# Patient Record
Sex: MALE | Race: WHITE
[De-identification: names, ages, dates, MRNs, and addresses within clinical notes are randomized per-mention and may not be internally consistent; named-entity substitution may affect disease eponyms.]

---

## 2020-11-20 ENCOUNTER — HOSPITAL ENCOUNTER (EMERGENCY)
Dept: HOSPITAL 1 - ED | Age: 40
Discharge: HOME | End: 2020-11-20
Payer: COMMERCIAL

## 2020-11-20 VITALS
WEIGHT: 180 LBS | BODY MASS INDEX: 27.28 KG/M2 | HEIGHT: 68 IN | WEIGHT: 180 LBS | BODY MASS INDEX: 27.28 KG/M2 | HEIGHT: 68 IN

## 2020-11-20 VITALS — DIASTOLIC BLOOD PRESSURE: 76 MMHG | SYSTOLIC BLOOD PRESSURE: 126 MMHG

## 2020-11-20 DIAGNOSIS — R63.0: ICD-10-CM

## 2020-11-20 DIAGNOSIS — R53.83: ICD-10-CM

## 2020-11-20 DIAGNOSIS — M79.10: Primary | ICD-10-CM

## 2020-11-20 DIAGNOSIS — Z20.828: ICD-10-CM

## 2020-11-20 LAB
ALBUMIN SERPL-MCNC: 4 G/DL (ref 3.4–5)
ALP SERPL-CCNC: 75 U/L (ref 46–116)
ALT SERPL-CCNC: 22 U/L (ref 16–63)
AMPHETAMINES UR QL SCN: (no result)
AST SERPL-CCNC: 16 U/L (ref 15–37)
BASOPHILS NFR BLD: 0.2 % (ref 0–2)
BILIRUB SERPL-MCNC: 0.5 MG/DL (ref 0.2–1)
BUN SERPL-MCNC: 6 MG/DL (ref 7–18)
CALCIUM SERPL-MCNC: 9.3 MG/DL (ref 8.5–10.1)
CHLORIDE SERPL-SCNC: 106 MMOL/L (ref 98–107)
CO2 SERPL-SCNC: 24.5 MMOL/L (ref 21–32)
CREAT SERPL-MCNC: 1.2 MG/DL (ref 0.7–1.3)
ERYTHROCYTE [DISTWIDTH] IN BLOOD BY AUTOMATED COUNT: 13.8 % (ref 11.5–14.5)
GFR SERPLBLD BASED ON 1.73 SQ M-ARVRAT: > 60 ML/MIN
GLUCOSE SERPL-MCNC: 113 MG/DL (ref 74–106)
PLATELET # BLD: 350 X10^3MCL (ref 130–400)
POTASSIUM SERPL-SCNC: 3.6 MMOL/L (ref 3.5–5.1)
PROT SERPL-MCNC: 7 G/DL (ref 6.4–8.2)
SODIUM SERPL-SCNC: 141 MMOL/L (ref 136–145)
T3 SERPL-MCNC: 1.47 NG/ML
T3RU NFR SERPL: 38 % UPTAKE (ref 33–39)
T4 FREE SERPL-MCNC: 1.29 NG/DL (ref 0.76–1.46)
T4 SERPL-MCNC: 10.5 UG/DL (ref 4.7–13.3)
T4/T3 UPTAKE INDEX SERPL: 4 UG/DL (ref 1.4–4.5)

## 2020-11-20 PROCEDURE — U0003 INFECTIOUS AGENT DETECTION BY NUCLEIC ACID (DNA OR RNA); SEVERE ACUTE RESPIRATORY SYNDROME CORONAVIRUS 2 (SARS-COV-2) (CORONAVIRUS DISEASE [COVID-19]), AMPLIFIED PROBE TECHNIQUE, MAKING USE OF HIGH THROUGHPUT TECHNOLOGIES AS DESCRIBED BY CMS-2020-01-R: HCPCS

## 2023-08-22 ENCOUNTER — HOSPITAL ENCOUNTER (EMERGENCY)
Facility: HOSPITAL | Age: 43
Discharge: HOME | End: 2023-08-22
Attending: EMERGENCY MEDICINE

## 2023-08-22 VITALS
TEMPERATURE: 98.2 F | OXYGEN SATURATION: 99 % | HEIGHT: 68 IN | BODY MASS INDEX: 27.28 KG/M2 | RESPIRATION RATE: 18 BRPM | DIASTOLIC BLOOD PRESSURE: 70 MMHG | WEIGHT: 180 LBS | SYSTOLIC BLOOD PRESSURE: 122 MMHG | HEART RATE: 70 BPM

## 2023-08-22 DIAGNOSIS — K04.7 DENTAL INFECTION: ICD-10-CM

## 2023-08-22 DIAGNOSIS — K08.89 PAIN, DENTAL: ICD-10-CM

## 2023-08-22 DIAGNOSIS — K03.81 CRACKED TOOTH: Primary | ICD-10-CM

## 2023-08-22 DIAGNOSIS — L28.2 PRURITIC RASH: ICD-10-CM

## 2023-08-22 PROCEDURE — 99283 EMERGENCY DEPT VISIT LOW MDM: CPT

## 2023-08-22 RX ORDER — PREDNISONE 20 MG/1
20 TABLET ORAL 2 TIMES DAILY
Qty: 10 TABLET | Refills: 0 | Status: SHIPPED | OUTPATIENT
Start: 2023-08-22 | End: 2023-08-27

## 2023-08-22 RX ORDER — HYDROXYZINE HYDROCHLORIDE 25 MG/1
25 TABLET, FILM COATED ORAL EVERY 8 HOURS PRN
Qty: 30 TABLET | Refills: 0 | Status: SHIPPED | OUTPATIENT
Start: 2023-08-22 | End: 2023-09-01

## 2023-08-22 RX ORDER — NAPROXEN 500 MG/1
500 TABLET ORAL 2 TIMES DAILY PRN
Qty: 30 TABLET | Refills: 0 | Status: SHIPPED | OUTPATIENT
Start: 2023-08-22

## 2023-08-22 RX ORDER — AMOXICILLIN 500 MG/1
500 CAPSULE ORAL 3 TIMES DAILY
Qty: 21 CAPSULE | Refills: 0 | Status: SHIPPED | OUTPATIENT
Start: 2023-08-22 | End: 2023-08-29

## 2023-08-22 RX ORDER — LIDOCAINE HYDROCHLORIDE 20 MG/ML
10 SOLUTION OROPHARYNGEAL EVERY 4 HOURS PRN
Qty: 110 ML | Refills: 0 | Status: SHIPPED | OUTPATIENT
Start: 2023-08-22 | End: 2023-08-27

## 2023-08-22 ASSESSMENT — PAIN SCALES - GENERAL
PAINLEVEL_OUTOF10: 10
PAINLEVEL_OUTOF10: 6

## 2023-08-22 ASSESSMENT — PAIN - FUNCTIONAL ASSESSMENT
PAIN_FUNCTIONAL_ASSESSMENT: 0-10
PAIN_FUNCTIONAL_ASSESSMENT: 0-10

## 2023-08-22 ASSESSMENT — ENCOUNTER SYMPTOMS: RESPIRATORY NEGATIVE: 1

## 2023-08-22 NOTE — ED TRIAGE NOTES
Pt came from triage , pt complains of on and off dental pain for few days, he said he has broken tooth , denies fever/chills

## 2023-08-22 NOTE — DISCHARGE INSTRUCTIONS
Take medication as prescribed. Follow-up with your primary care physician within 2 days for reassessment. Bring the results from this visit with you for their review. Return to the ED immediately for any new, worsening, or persistent symptoms, including fever, vomiting, or any other medical concerns. Follow-up with one of the provided dental clinics below:    [address] [facility] -Extraction [phone] [facility] [phone], [phone]  [address] [place] [address] [phone]  [facility] [phone], [phone]      Call to schedule an appointment.

## 2023-08-22 NOTE — ED PROVIDER NOTES
EMERGENCY DEPARTMENT HISTORY AND PHYSICAL EXAM    3:14 PM      Date: 8/22/2023  Patient Name: Franchesca Bassett    History of Presenting Illness     Chief Complaint   Patient presents with    Dental Pain         History Provided By: Patient and medical chart review. Additional History (Context): Franchesca Bassett is a 44 y.o. male with No past medical history on file. who presents with complaints of dental pain for the past 2 days. States he has a cracked tooth that he has had for several years and has never really given him any issues. States he started with some cold sensitivity about 2 months ago and now he has been getting pain on and off. States dental pain started about 2 days ago. States he tried Motrin, Tylenol and Orajel no relief in any symptoms. States last night he could not sleep due to the pain. Patient denies any fevers. States occasionally he feels like he can taste some blood. Patient denies any chest pain or shortness of breath. Patient also reports he has had a rash. States the rash has been going on for the past 2 to 3 weeks started to his upper to his lower forearms. Now it is to his upper arms underarms and back. States is an itchy rash. Denies any changes in soaps or body wash. Has not tried any medications to relieve his rash. PCP: No primary care provider on file. No current facility-administered medications for this encounter. Current Outpatient Medications   Medication Sig Dispense Refill    naproxen (NAPROSYN) 500 MG tablet Take 1 tablet by mouth 2 times daily as needed for Pain 30 tablet 0    lidocaine viscous hcl (XYLOCAINE) 2 % SOLN solution Take 10 mLs by mouth every 4 hours as needed for Dental Pain or Pain Take 15 mLs by mouth every 4 hours as needed for Irritation or Pain.   You can swish and swallow or gargle 110 mL 0    amoxicillin (AMOXIL) 500 MG capsule Take 1 capsule by mouth 3 times daily for 7 days 21 capsule 0    predniSONE (DELTASONE) 20

## 2024-02-06 ENCOUNTER — HOSPITAL ENCOUNTER (EMERGENCY)
Facility: HOSPITAL | Age: 44
Discharge: HOME | End: 2024-02-06
Payer: COMMERCIAL

## 2024-02-06 VITALS
OXYGEN SATURATION: 97 % | HEART RATE: 83 BPM | DIASTOLIC BLOOD PRESSURE: 91 MMHG | SYSTOLIC BLOOD PRESSURE: 112 MMHG | RESPIRATION RATE: 18 BRPM | TEMPERATURE: 98.2 F

## 2024-02-06 DIAGNOSIS — U07.1 COVID: Primary | ICD-10-CM

## 2024-02-06 LAB
FLUAV RNA SPEC QL NAA+PROBE: NOT DETECTED
FLUBV RNA SPEC QL NAA+PROBE: NOT DETECTED
SARS-COV-2 RNA RESP QL NAA+PROBE: DETECTED

## 2024-02-06 PROCEDURE — 87636 SARSCOV2 & INF A&B AMP PRB: CPT

## 2024-02-06 PROCEDURE — 99283 EMERGENCY DEPT VISIT LOW MDM: CPT

## 2024-02-06 RX ORDER — PSEUDOEPHEDRINE HCL 120 MG/1
120 TABLET, FILM COATED, EXTENDED RELEASE ORAL EVERY 12 HOURS
Qty: 20 TABLET | Refills: 0 | Status: SHIPPED | OUTPATIENT
Start: 2024-02-06 | End: 2024-02-16

## 2024-02-06 RX ORDER — FLUTICASONE PROPIONATE 50 MCG
1 SPRAY, SUSPENSION (ML) NASAL DAILY
Qty: 32 G | Refills: 1 | Status: SHIPPED | OUTPATIENT
Start: 2024-02-06

## 2024-02-06 ASSESSMENT — ENCOUNTER SYMPTOMS
NAUSEA: 0
SINUS PRESSURE: 1
VOMITING: 0

## 2024-02-06 NOTE — ED TRIAGE NOTES
Pt arrived in ER complaining of sinus headache, fatigue, chills, nausea, loss of appetite, and ear pain. Pt states he feels like his balance is off from the pressure.

## 2024-02-06 NOTE — ED PROVIDER NOTES
components:       Result Value    SARS-CoV-2, PCR Detected (*)     All other components within normal limits       All other labs were within normal range or not returned as of this dictation.    EMERGENCY DEPARTMENT COURSE and DIFFERENTIAL DIAGNOSIS/MDM:   Vitals:    Vitals:    02/06/24 0759   BP: (!) 112/91   Pulse: 83   Resp: 18   Temp: 98.2 °F (36.8 °C)   TempSrc: Oral   SpO2: 97%       Discussion with patient regarding positive COVID test results.  Will forego any kind of advanced imaging to evaluate for any kind of tumor process and encouraged him PCP follow-up and could if especially if nonresolution of his symptoms CT scan performed them but has reasonable explanation for his symptoms today.    Medical Decision Making  Risk  OTC drugs.            REASSESSMENT        FINAL IMPRESSION      1. COVID          DISPOSITION/PLAN   DISPOSITION Decision To Discharge 02/06/2024 09:48:01 AM      PATIENT REFERRED TO:  [facility]  [address] [address]  [phone]        [facility] EMERGENCY DEPT  [address] [address]  [phone]    If symptoms worsen or unable to obtain follow up, return immediately      DISCHARGE MEDICATIONS:  New Prescriptions    FLUTICASONE (FLONASE) 50 MCG/ACT NASAL SPRAY    1 spray by Each Nostril route daily    PSEUDOEPHEDRINE (SUDAFED 12 HOUR) 120 MG EXTENDED RELEASE TABLET    Take 1 tablet by mouth in the morning and 1 tablet in the evening. Do all this for 10 days.     Controlled Substances Monitoring:          No data to display                (Please note that portions of this note were completed with a voice recognition program.  Efforts were made to edit the dictations but occasionally words are mis-transcribed.)    ADELA Bernal (electronically signed)  Attending Emergency Physician           Hortensia Petersen PA  02/06/24 2409

## 2024-10-12 ENCOUNTER — HOSPITAL ENCOUNTER (EMERGENCY)
Facility: HOSPITAL | Age: 44
Discharge: HOME | End: 2024-10-12

## 2024-10-12 VITALS
RESPIRATION RATE: 20 BRPM | HEIGHT: 69 IN | DIASTOLIC BLOOD PRESSURE: 98 MMHG | TEMPERATURE: 97.9 F | BODY MASS INDEX: 27.4 KG/M2 | HEART RATE: 85 BPM | SYSTOLIC BLOOD PRESSURE: 118 MMHG | WEIGHT: 185 LBS | OXYGEN SATURATION: 96 %

## 2024-10-12 DIAGNOSIS — S05.01XA ABRASION OF RIGHT CORNEA, INITIAL ENCOUNTER: Primary | ICD-10-CM

## 2024-10-12 DIAGNOSIS — K08.89 PAIN, DENTAL: ICD-10-CM

## 2024-10-12 PROCEDURE — 2500000003 HC RX 250 WO HCPCS

## 2024-10-12 PROCEDURE — 6370000000 HC RX 637 (ALT 250 FOR IP)

## 2024-10-12 PROCEDURE — 99283 EMERGENCY DEPT VISIT LOW MDM: CPT

## 2024-10-12 RX ORDER — ERYTHROMYCIN 5 MG/G
OINTMENT OPHTHALMIC
Qty: 1 EACH | Refills: 0 | Status: SHIPPED | OUTPATIENT
Start: 2024-10-12

## 2024-10-12 RX ORDER — LIDOCAINE HYDROCHLORIDE 20 MG/ML
15 SOLUTION OROPHARYNGEAL
Qty: 100 ML | Refills: 0 | Status: SHIPPED | OUTPATIENT
Start: 2024-10-12

## 2024-10-12 RX ORDER — PROPARACAINE HYDROCHLORIDE 5 MG/ML
1 SOLUTION/ DROPS OPHTHALMIC
Status: COMPLETED | OUTPATIENT
Start: 2024-10-12 | End: 2024-10-12

## 2024-10-12 RX ADMIN — FLUORESCEIN SODIUM 2 MG: 1 STRIP OPHTHALMIC at 15:00

## 2024-10-12 RX ADMIN — PROPARACAINE HYDROCHLORIDE 1 DROP: 5 SOLUTION/ DROPS OPHTHALMIC at 15:00

## 2024-10-12 ASSESSMENT — PAIN DESCRIPTION - ORIENTATION
ORIENTATION: RIGHT
ORIENTATION: RIGHT

## 2024-10-12 ASSESSMENT — PAIN - FUNCTIONAL ASSESSMENT: PAIN_FUNCTIONAL_ASSESSMENT: 0-10

## 2024-10-12 ASSESSMENT — PAIN SCALES - GENERAL
PAINLEVEL_OUTOF10: 5
PAINLEVEL_OUTOF10: 5

## 2024-10-12 ASSESSMENT — PAIN DESCRIPTION - LOCATION
LOCATION: EYE
LOCATION: EYE

## 2024-10-12 ASSESSMENT — PAIN DESCRIPTION - DESCRIPTORS: DESCRIPTORS: BURNING;SHARP

## 2024-10-12 NOTE — ED TRIAGE NOTES
Pt presents to triage for R eye injury. Pt was cutting wood at work and had piece of wood lodge into his R eye. Eye is swollen. Provider in triage.

## 2024-10-12 NOTE — DISCHARGE INSTRUCTIONS
Please keep eye clean and dry, and please follow-up with Dr. Medina who is an ophthalmologist please return to the ER immediately if worsening or new development of symptoms.      Follow-up with one of the provided dental clinics below:    [facility] [phone]  [facility] [phone]  [facility] [phone]  [facility] [phone]  [facility] [phone]  [facility] [phone]    Call to schedule an appointment.

## 2024-10-12 NOTE — ED NOTES
Pt set up with 1000cc bag Normal Saline/Dayo Lens via gravity line, for irrigation of R eye. Pt stated he probably could not tolerate lens under eyelids. Pt currently holding line directly to eye.

## 2024-10-12 NOTE — ED PROVIDER NOTES
EMERGENCY DEPARTMENT HISTORY AND PHYSICAL EXAM      Patient Name: Arpan Clark  MRN: [id number]  YOB: 1980  Provider: Suzan Kuo PA-C  PCP: No primary care provider on file.   Time/Date of evaluation: 2:28 PM EDT on 10/12/24    History of Presenting Illness     Chief Complaint   Patient presents with    Foreign Body in Eye       History Provided By: Patient     History (Narative):   Arpan Clark is a 44 y.o. male with no contributory PMHx who presents to the emergency department  by POV C/O of concerns of foreign body in the eye.  Patient states that he was cutting wood at work, when a piece of wood flew into his right eye.  Patient is having significant pain, drainage from eye.  He denies taking any over-the-counter medications      Patient is also concerned because he has been having dental pain for the past couple of days he has history of significant dental caries and has not been following up with a dentist.  Patient is requesting an antibiotic denies any fever drainage, dysphagia, drooling  Past History     Past Medical History:  No past medical history on file.    Past Surgical History:  No past surgical history on file.    Family History:  No family history on file.    Social History:       Medications:  No current facility-administered medications for this encounter.     Current Outpatient Medications   Medication Sig Dispense Refill    erythromycin (ROMYCIN) 5 MG/GM ophthalmic ointment 1 inch ribbon to both eyes twice daily for 7 days 1 each 0    amoxicillin-clavulanate (AUGMENTIN) 875-125 MG per tablet Take 1 tablet by mouth 2 times daily for 7 days 14 tablet 0    lidocaine viscous hcl (XYLOCAINE) 2 % SOLN solution Take 15 mLs by mouth every 3 hours as needed for Irritation Place onto gauze and hold in place adjacent to affected tooth 100 mL 0    fluticasone (FLONASE) 50 MCG/ACT nasal spray 1 spray by Each Nostril route daily 32 g 1    naproxen (NAPROSYN) 500 MG